# Patient Record
Sex: FEMALE | Race: WHITE | NOT HISPANIC OR LATINO | Employment: FULL TIME | URBAN - METROPOLITAN AREA
[De-identification: names, ages, dates, MRNs, and addresses within clinical notes are randomized per-mention and may not be internally consistent; named-entity substitution may affect disease eponyms.]

---

## 2019-06-13 ENCOUNTER — HOSPITAL ENCOUNTER (EMERGENCY)
Facility: HOSPITAL | Age: 19
Discharge: HOME | End: 2019-06-13
Attending: EMERGENCY MEDICINE

## 2019-06-13 VITALS
SYSTOLIC BLOOD PRESSURE: 105 MMHG | OXYGEN SATURATION: 98 % | TEMPERATURE: 98.2 F | RESPIRATION RATE: 16 BRPM | HEART RATE: 88 BPM | DIASTOLIC BLOOD PRESSURE: 67 MMHG

## 2019-06-13 DIAGNOSIS — H93.8X3 CONGESTION OF BOTH EARS: ICD-10-CM

## 2019-06-13 DIAGNOSIS — J02.9 PHARYNGITIS, UNSPECIFIED ETIOLOGY: Primary | ICD-10-CM

## 2019-06-13 PROCEDURE — 99283 EMERGENCY DEPT VISIT LOW MDM: CPT | Mod: 25

## 2019-06-13 PROCEDURE — 3E023GC INTRODUCTION OF OTHER THERAPEUTIC SUBSTANCE INTO MUSCLE, PERCUTANEOUS APPROACH: ICD-10-PCS | Performed by: EMERGENCY MEDICINE

## 2019-06-13 PROCEDURE — 63700000 HC SELF-ADMINISTRABLE DRUG: Performed by: EMERGENCY MEDICINE

## 2019-06-13 PROCEDURE — 96372 THER/PROPH/DIAG INJ SC/IM: CPT

## 2019-06-13 PROCEDURE — 63600000 HC DRUGS/DETAIL CODE: Performed by: EMERGENCY MEDICINE

## 2019-06-13 RX ORDER — KETOROLAC TROMETHAMINE 30 MG/ML
30 INJECTION, SOLUTION INTRAMUSCULAR; INTRAVENOUS ONCE
Status: COMPLETED | OUTPATIENT
Start: 2019-06-13 | End: 2019-06-13

## 2019-06-13 RX ORDER — METHYLPREDNISOLONE 4 MG/1
TABLET ORAL
Qty: 1 TABLET | Refills: 0 | Status: SHIPPED | OUTPATIENT
Start: 2019-06-13

## 2019-06-13 RX ADMIN — KETOROLAC TROMETHAMINE 30 MG: 30 INJECTION, SOLUTION INTRAMUSCULAR at 07:01

## 2019-06-13 RX ADMIN — PREDNISONE 60 MG: 50 TABLET ORAL at 07:01

## 2019-06-13 ASSESSMENT — ENCOUNTER SYMPTOMS
LIGHT-HEADEDNESS: 0
CONFUSION: 0
CONSTIPATION: 0
BACK PAIN: 0
NAUSEA: 1
SHORTNESS OF BREATH: 0
HEADACHES: 0
COUGH: 0
ARTHRALGIAS: 0
NECK PAIN: 0
FEVER: 1
VOMITING: 0
RHINORRHEA: 0
DIZZINESS: 0
WEAKNESS: 0
BLOOD IN STOOL: 0
DIARRHEA: 0
DIFFICULTY URINATING: 0
SORE THROAT: 1
EYE PAIN: 0
FATIGUE: 1
CHILLS: 1

## 2019-06-13 NOTE — ED NOTES
Instructed on how to use medrol dose pack. Instructed to take steroid in the am with breakfast to avoid insomnia and stomach irritation. Encouraged oral fluids and tylenol for comfort. Will F/U with Stefanie Morgan RN  06/13/19 [id number]

## 2019-06-13 NOTE — ED PROVIDER NOTES
HPI     Chief Complaint   Patient presents with   • Headache       Patient is a 18-year-old female presents to the emergency department with complaint of nasal congestion and bilateral ear pain.  This is patient's third visit in 3 days.  Patient was first seen in urgent care on Tuesday, June 11.  Patient states that at that time she visited fever, sore throat.  Patient was diagnosed with strep throat at that time was started on amoxicillin.  On Wednesday, June 11 patient was seen in outside hospital emergency department.  At that time patient had blood work drawn he was given IV fluids.  At the time of that admission patient was advised that her potassium was slightly low and she was also slightly anemic.  Patient was discharged from the hospital instructed to follow-up as an outpatient and also advised to continue her medications as previously prescribed.  Patient is here in the emergency department now complaining of ear pain and nasal congestion.  Patient states that she has not taken any over-the-counter meds other than Mucinex and Advil in an effort to help relieve her symptoms.  Patient states that nothing makes the congestion feel better, certain positions and movements make it feel worse.  Patient took Mucinex and her amoxicillin without relief of symptoms.  Patient is reportedly otherwise healthy, she is currently living in [place] but is visiting in the area.  She is under significant stress given that she is currently looking for a full-time job.  Patient has no known sick contacts.  No recent hospitalizations.  Patient has no significant recent travel history.             Patient History     History reviewed. No pertinent past medical history.    History reviewed. No pertinent surgical history.    History reviewed. No pertinent family history.    Social History   Substance Use Topics   • Smoking status: Never Smoker   • Smokeless tobacco: Never Used   • Alcohol use No       Systems Reviewed from Nursing  Triage:          Review of Systems     Review of Systems   Constitutional: Positive for chills, fatigue and fever.   HENT: Positive for congestion, ear pain, postnasal drip, sneezing and sore throat. Negative for rhinorrhea.    Eyes: Negative for pain.   Respiratory: Negative for cough and shortness of breath.    Cardiovascular: Negative for chest pain.   Gastrointestinal: Positive for nausea. Negative for blood in stool, constipation, diarrhea and vomiting.   Genitourinary: Negative for difficulty urinating.   Musculoskeletal: Negative for arthralgias, back pain and neck pain.   Skin: Negative for rash.   Neurological: Negative for dizziness, weakness, light-headedness and headaches.   Psychiatric/Behavioral: Negative for confusion.        Physical Exam     ED Triage Vitals   Temp Heart Rate Resp BP SpO2   06/13/19 0513 06/13/19 0706 06/13/19 0513 06/13/19 0513 06/13/19 0513   36.8 °C (98.2 °F) 88 16 125/86 98 %      Temp src Heart Rate Source Patient Position BP Location FiO2 (%) (Set)   -- -- 06/13/19 0513 06/13/19 0513 --     Sitting Right upper arm        Pulse Ox %: 98 % (06/13/19 0640)  Pulse Ox Interpretation: Normal (06/13/19 0640)  Heart Rate: 103 (06/13/19 0640)       Patient Vitals for the past 24 hrs:   BP Temp Pulse Resp SpO2   06/13/19 0706 105/67 36.8 °C (98.2 °F) 88 16 -   06/13/19 0513 125/86 36.8 °C (98.2 °F) - 16 98 %           Physical Exam   Constitutional: She is oriented to person, place, and time. She appears well-developed and well-nourished.   HENT:   Head: Normocephalic.   Nose: Nose normal.   Mild bilateral tonsillar exudates, pharyngeal erythema   Eyes: Pupils are equal, round, and reactive to light. Conjunctivae and EOM are normal.   Neck: Normal range of motion. Neck supple.   Cardiovascular: Normal rate, regular rhythm, normal heart sounds and intact distal pulses.    Pulmonary/Chest: Effort normal and breath sounds normal.   Abdominal: Soft. Bowel sounds are normal.    Musculoskeletal: Normal range of motion. She exhibits no edema.   Lymphadenopathy:     She has no cervical adenopathy.   Neurological: She is alert and oriented to person, place, and time. She has normal strength. No sensory deficit.   Skin: Skin is warm and dry.   Psychiatric: She has a normal mood and affect. Her behavior is normal.   Nursing note and vitals reviewed.           Procedures    ED Course & MDM     Labs Reviewed - No data to display    No orders to display               MDM  Number of Diagnoses or Management Options  Congestion of both ears:   Pharyngitis, unspecified etiology:   Diagnosis management comments: 18-year-old female to the emergency department complaining of nasal congestion ear pain, recently diagnosed with strep throat.    Patient educated with regard treatment strategy for strep throat.  Patient was encouraged to finish the entire course of amoxicillin that she was prescribed.  Plan to augment patient's treatment regimen with p.o. steroids.  Patient was also encouraged to take over-the-counter decongestants as discussed.  Patient was encouraged to follow closely with her primary care provider as well as return to the emergency department for worsening or other concerns.           Clinical Impressions as of Jun 13 0819   Pharyngitis, unspecified etiology   Congestion of both ears        Phoebe Man DO  06/13/19 0819

## 2019-06-13 NOTE — DISCHARGE INSTRUCTIONS
Follow closely with your regular doctor.  Take the amoxicillin until it is gone.  Try taking an over-the-counter decongestant such as Sudafed.  You can start the steroid Dosepak on Friday, June 14.  Return to the emergency department for worsening or other concerns.